# Patient Record
Sex: FEMALE | Race: WHITE
[De-identification: names, ages, dates, MRNs, and addresses within clinical notes are randomized per-mention and may not be internally consistent; named-entity substitution may affect disease eponyms.]

---
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
## 2024-11-03 NOTE — NUR
FATHER CAME BACK WITH PT AND WAS PRESENT DURING OR RN (MARLYS) AND DR YANG
TALKING TO PT normal performance